# Patient Record
Sex: MALE | Race: WHITE | NOT HISPANIC OR LATINO | URBAN - METROPOLITAN AREA
[De-identification: names, ages, dates, MRNs, and addresses within clinical notes are randomized per-mention and may not be internally consistent; named-entity substitution may affect disease eponyms.]

---

## 2020-08-10 ENCOUNTER — IMPORTED ENCOUNTER (OUTPATIENT)
Dept: URBAN - METROPOLITAN AREA CLINIC 50 | Facility: CLINIC | Age: 75
End: 2020-08-10

## 2020-08-14 ENCOUNTER — IMPORTED ENCOUNTER (OUTPATIENT)
Dept: URBAN - METROPOLITAN AREA CLINIC 50 | Facility: CLINIC | Age: 75
End: 2020-08-14

## 2020-08-17 ENCOUNTER — IMPORTED ENCOUNTER (OUTPATIENT)
Dept: URBAN - METROPOLITAN AREA CLINIC 50 | Facility: CLINIC | Age: 75
End: 2020-08-17

## 2020-08-19 ENCOUNTER — IMPORTED ENCOUNTER (OUTPATIENT)
Dept: URBAN - METROPOLITAN AREA CLINIC 50 | Facility: CLINIC | Age: 75
End: 2020-08-19

## 2021-01-14 ENCOUNTER — IMPORTED ENCOUNTER (OUTPATIENT)
Dept: URBAN - METROPOLITAN AREA CLINIC 50 | Facility: CLINIC | Age: 76
End: 2021-01-14

## 2021-01-19 ENCOUNTER — IMPORTED ENCOUNTER (OUTPATIENT)
Dept: URBAN - METROPOLITAN AREA CLINIC 50 | Facility: CLINIC | Age: 76
End: 2021-01-19

## 2021-04-17 ASSESSMENT — VISUAL ACUITY
OS_CC: J1
OD_SC: 20/40
OD_OTHER: 20/30. 20/30.
OD_BAT: 20/30
OD_CC: J1
OS_SC: 20/40+2
OD_CC: J1
OS_BAT: 20/50
OD_SC: 20/40-2
OS_CC: J1
OS_SC: 20/40-2
OS_OTHER: 20/50. 20/70.

## 2021-04-17 ASSESSMENT — TONOMETRY
OD_IOP_MMHG: 15
OD_IOP_MMHG: 16
OS_IOP_MMHG: 16
OS_IOP_MMHG: 15

## 2021-07-09 ENCOUNTER — PREPPED CHART (OUTPATIENT)
Dept: URBAN - METROPOLITAN AREA CLINIC 48 | Facility: CLINIC | Age: 76
End: 2021-07-09

## 2021-07-15 ENCOUNTER — DILATED FUNDUS EXAM (OUTPATIENT)
Dept: URBAN - METROPOLITAN AREA CLINIC 48 | Facility: CLINIC | Age: 76
End: 2021-07-15

## 2021-07-15 DIAGNOSIS — M35.00: ICD-10-CM

## 2021-07-15 DIAGNOSIS — H35.3132: ICD-10-CM

## 2021-07-15 DIAGNOSIS — H25.12: ICD-10-CM

## 2021-07-15 DIAGNOSIS — H26.491: ICD-10-CM

## 2021-07-15 PROCEDURE — 92134 CPTRZ OPH DX IMG PST SGM RTA: CPT

## 2021-07-15 PROCEDURE — 92014 COMPRE OPH EXAM EST PT 1/>: CPT

## 2021-07-15 ASSESSMENT — VISUAL ACUITY
OU_CC: J2
OS_SC: 20/40
OD_SC: 20/30-2
OU_SC: 20/40+2

## 2021-07-15 ASSESSMENT — TONOMETRY
OS_IOP_MMHG: 19
OD_IOP_MMHG: 16

## 2021-07-15 NOTE — PATIENT DISCUSSION
Follow dry ARMD without treatment. MVI/AREDS/A[facility]ler. Patient to call if vision changes or distortion increases. Good diet/do not smoke. RTC 6 months DFE/OCT MAC.

## 2021-07-15 NOTE — PATIENT DISCUSSION
PCO (DEFERS): Visually significant PCO present on exam today. Recommend YAG laser capsulotomy to improve vision and decrease glare symptoms. RBAs of procedure discussed. Patient defers procedure at this time. Will continue to monitor for progression.

## 2021-12-22 ENCOUNTER — COMPREHENSIVE EXAM (OUTPATIENT)
Dept: URBAN - METROPOLITAN AREA CLINIC 48 | Facility: CLINIC | Age: 76
End: 2021-12-22

## 2021-12-22 DIAGNOSIS — H25.12: ICD-10-CM

## 2021-12-22 DIAGNOSIS — H35.372: ICD-10-CM

## 2021-12-22 DIAGNOSIS — H35.3132: ICD-10-CM

## 2021-12-22 DIAGNOSIS — H26.491: ICD-10-CM

## 2021-12-22 DIAGNOSIS — M35.00: ICD-10-CM

## 2021-12-22 PROCEDURE — 92014 COMPRE OPH EXAM EST PT 1/>: CPT

## 2021-12-22 PROCEDURE — 92134 CPTRZ OPH DX IMG PST SGM RTA: CPT

## 2021-12-22 ASSESSMENT — VISUAL ACUITY
OD_SC: 20/40-1
OU_CC: J2
OU_SC: 20/40-1
OS_SC: 20/40-1
OD_GLARE: 20/100
OS_GLARE: 20/200
OD_GLARE: 20/200
OS_CC: J2
OD_CC: J5

## 2021-12-22 ASSESSMENT — TONOMETRY
OS_IOP_MMHG: 16
OD_IOP_MMHG: 16

## 2021-12-28 NOTE — PATIENT DISCUSSION
Care Transitions - Status Update 12/28/2021   [facility] Program    Bundle end date 12/27/2021, closed. Patient active with [facility].    MICHAEL LongN, RN  Transition   [facility] Program  Ph: [phone]     PCO ([address] [place] [address]): Visually significant PCO present on exam today. Recommend YAG laser capsulotomy to improve vision and decrease glare symptoms. RBAs of procedure discussed. Patient agrees and wishes to proceed.

## 2022-11-04 NOTE — PROCEDURE NOTE: CLINICAL
PROCEDURE NOTE: YAG Capsulotomy OD. Diagnosis: Posterior Capsular Opacification (PCO). Prep: Mydriacil 1% and Phenylephrine 2.5%. Prior to treatment, the risks/benefits/alternatives were discussed. The patient wished to proceed with procedure. Consent was signed. Proparacaine and brominidine were placed into the operative eye after the eye was dilated. Power = 3.8mJ. Number of pulses = 36. Patient tolerated procedure well and there were no complications. Post Laser instructions given. Jenni Mejia

## 2022-11-04 NOTE — PATIENT DISCUSSION
PCO ([address]): Visually significant PCO present on exam today. Recommend YAG laser capsulotomy to improve vision and decrease glare symptoms. RBAs of procedure discussed. Patient agrees and wishes to proceed.